# Patient Record
Sex: FEMALE | URBAN - METROPOLITAN AREA
[De-identification: names, ages, dates, MRNs, and addresses within clinical notes are randomized per-mention and may not be internally consistent; named-entity substitution may affect disease eponyms.]

---

## 2023-05-19 ENCOUNTER — APPOINTMENT (RX ONLY)
Dept: URBAN - METROPOLITAN AREA CLINIC 164 | Facility: CLINIC | Age: 33
Setting detail: DERMATOLOGY
End: 2023-05-19

## 2023-05-19 DIAGNOSIS — Z41.9 ENCOUNTER FOR PROCEDURE FOR PURPOSES OTHER THAN REMEDYING HEALTH STATE, UNSPECIFIED: ICD-10-CM

## 2023-05-19 PROCEDURE — ? PRODUCT LINE (ZO SKIN HEALTH)

## 2023-05-19 PROCEDURE — ? DELUXE HYDRAFACIAL

## 2023-05-19 PROCEDURE — ? ADDITIONAL NOTES

## 2023-05-19 NOTE — PROCEDURE: DELUXE HYDRAFACIAL
Price (Use Numbers Only, No Special Characters Or $): 241 Price (Use Numbers Only, No Special Characters Or $): 389

## 2023-05-19 NOTE — PROCEDURE: ADDITIONAL NOTES
Detail Level: Zone
Additional Notes: Patient came in for a HydraFacial(Deluxe). Patients concerns are mainly blackheads/oil glands.\\nI preformed physical extractions along with HydraFacial. I recommended ZO Exfoliating Polish & Complexion Renewal Pads.\\n\\nPatient was pleased with treatment & did purchase products stated above. \\n\\nPatient left with products & literature. Patient will call to schedule any further treatments.
Render Risk Assessment In Note?: no

## 2023-05-19 NOTE — PROCEDURE: PRODUCT LINE (ZO SKIN HEALTH)
Product 4 Units: 0
Risk Of Complication Category: No MDM
Product 10 Price (In Dollars - Numeric Only, No Special Characters Or $): 47
Product 18 Application Directions: Apply 2x a week at night for week 0-2.\\nApply 3x a week at night for week 2-4.\\nApply 4x a week at night for week 4-6.
Product 33 Price (In Dollars - Numeric Only, No Special Characters Or $): 0.00
Product 7 Application Directions: Apply in the PM, include the neck. Can be applied in the AM if dry.
Name Of Product 13: Rozatrol
Product 4 Application Directions: Apply AM & PM 7x a week.
Product 21 Price (In Dollars - Numeric Only, No Special Characters Or $): 100
Product 25 Application Directions: Apply AM & PM on the lower lids only, after cleansing.
Allow Plan To Count Towards E/M Coding: Yes
Product 2 Price (In Dollars - Numeric Only, No Special Characters Or $): 68
Product 16 Price (In Dollars - Numeric Only, No Special Characters Or $): 193
Product 23 Application Directions: Apply to upper & lower lids after cleansing.
Name Of Product 8: Growth Factor Serum
Product 15 Application Directions: Apply 2x a week at night (0-2 weeks)\\nApply 3x a week at night (2-4 weeks)\\nApply 4x a week at night (4-6 weeks)
Name Of Product 19: Exfoliating Polish (travel)
Name Of Product 5: Complexion Renewal Pads
Product 13 Price (In Dollars - Numeric Only, No Special Characters Or $): 91
Product 10 Application Directions: Cleanse AM & PM 7x a week.
Name Of Product 16: Skin Brightening Program
Product 2 Units: 1
Product 8 Price (In Dollars - Numeric Only, No Special Characters Or $): 155
Product 19 Price (In Dollars - Numeric Only, No Special Characters Or $): 21
Product 2 Application Directions: Use AM or PM 2-3x a week.
Product 16 Application Directions: Written directions were handed to patient.
Product 5 Price (In Dollars - Numeric Only, No Special Characters Or $): 53
Name Of Product 11: Oil Control Pads
Name Of Product 22: Retinol Skin Brightener 0.1 Retinol
Product 13 Application Directions: Apply AM & PM before moisturizer.
Name Of Product 3: Calming Toner
Name Of Product 17: Sunscreen + Primer SPF 30
Product 11 Price (In Dollars - Numeric Only, No Special Characters Or $): 64
Name Of Product 24: ZO BrightAlive Skin Brightener
Product 19 Application Directions: Exfoliate 2-3x a week.
Product 5 Application Directions: Apply after cleansing in the AM & PM 7x a week.
Product 17 Price (In Dollars - Numeric Only, No Special Characters Or $): 67
Name Of Product 14: Dual Action Scrub
Product 24 Price (In Dollars - Numeric Only, No Special Characters Or $): 130
Product 3 Price (In Dollars - Numeric Only, No Special Characters Or $): 40
Name Of Product 20: Instant Pore Refiner
Name Of Product 9: Daily Power Defense
Product 14 Price (In Dollars - Numeric Only, No Special Characters Or $): 80
Name Of Product 6: Hydrating Cleanser
Name Of Product 1: Gentle Cleanser
Product 9 Price (In Dollars - Numeric Only, No Special Characters Or $): 160
Product 17 Application Directions: Apply AM & reapply if prolong in the sun, every 2 hours.
Product 22 Application Directions: Written instructions were handed to the patient.
Product 24 Application Directions: Apply AM & PM after cleansing & toner.
Product 20 Price (In Dollars - Numeric Only, No Special Characters Or $): 62
Product 3 Application Directions: Apply AM & PM 7x a week after cleansing or exfoliating (on days you exfoliate).
Name Of Product 12: Acne Control
Name Of Product 18: ZO Skin Brightener 0.5 Retinol
Product 14 Application Directions: Scrub in the PM 2-3 a week.
Name Of Product 7: Recovery Creme
Product 12 Price (In Dollars - Numeric Only, No Special Characters Or $): 36
Name Of Product 4: Renewal Creme
Detail Level: Zone
Name Of Product 23: Grown Factor Eye Serum
Name Of Product 25: Brightening Eye Creme
Name Of Product 15: Wrinkle + Texture Repair
Product 7 Price (In Dollars - Numeric Only, No Special Characters Or $): 120
Product 4 Price (In Dollars - Numeric Only, No Special Characters Or $): 115
Product 6 Application Directions: Use AM & PM 7x a week
Name Of Product 10: Exfoliating Cleanser
Assigning Risk Information: Per AMA, level of risk is based upon consequences of the problem(s) addressed at the encounter when appropriately treated. Risk also includes medical decision making related to the need to initiate or forego further testing, treatment and/or hospitalization. Over the counter medication are assigned a risk level of low. Prescription medication management is assigned a risk level of moderate.
Name Of Product 21: Retinol Skin Brightener 0.25
Product 15 Price (In Dollars - Numeric Only, No Special Characters Or $): 154
Name Of Product 2: Exfoliating Polish
Product 12 Application Directions: Cleanse & Apply to cystic flare up 1-3x a day.

## 2023-06-23 ENCOUNTER — APPOINTMENT (RX ONLY)
Dept: URBAN - METROPOLITAN AREA CLINIC 164 | Facility: CLINIC | Age: 33
Setting detail: DERMATOLOGY
End: 2023-06-23

## 2023-06-23 DIAGNOSIS — Z41.9 ENCOUNTER FOR PROCEDURE FOR PURPOSES OTHER THAN REMEDYING HEALTH STATE, UNSPECIFIED: ICD-10-CM

## 2023-06-23 PROCEDURE — ? ADDITIONAL NOTES

## 2023-06-23 PROCEDURE — ? PRODUCT LINE (OBAGI)

## 2023-06-23 PROCEDURE — ? COSMETIC CONSULTATION: CHEMICAL PEELS

## 2023-06-23 PROCEDURE — ? DELUXE HYDRAFACIAL

## 2023-06-23 PROCEDURE — ? COSMETIC QUOTE

## 2023-06-23 NOTE — PROCEDURE: COSMETIC CONSULTATION: CHEMICAL PEELS
Price (Use Numbers Only, No Special Characters Or $): 389 Consultation Charge $ (Use Numbers Only, No Special Characters Or $): 900

## 2023-06-23 NOTE — PROCEDURE: PRODUCT LINE (OBAGI)
Product 23 Units: 0
Product 46 Price (In Dollars - Numeric Only, No Special Characters Or $): 0.00
Product 18 Application Directions: Apply 2-3x a week only in the PM.
Product 6 Application Directions: On cleansed skin apply to upper lash line nightly.
Name Of Product 14: ELASTIderm Facial Serum
Product 4 Price (In Dollars - Numeric Only, No Special Characters Or $): 75
Product 21 Price (In Dollars - Numeric Only, No Special Characters Or $): 40.50
Product 16 Price (In Dollars - Numeric Only, No Special Characters Or $): 84
Send Charges To Patient Encounter: Yes
Product 23 Application Directions: Mix a pea size amount with Obagi Nu-Derm  every other night.
Product 11 Application Directions: Apply AM & PM after cleansing.
Name Of Product 2: ELASTIderm Eye Serum
Product 9 Price (In Dollars - Numeric Only, No Special Characters Or $): 42
Product 14 Price (In Dollars - Numeric Only, No Special Characters Or $): 198
Name Of Product 19: ELASTIderm Eye Cream
Name Of Product 7: Obagi Toner
Name Of Product 24: Nu-Cil Eyebrow Boosting Serum
Product 4 Application Directions: Apply AM & PM
Name Of Product 12: Obagi SunShield SPF 50 Matte
Product 2 Price (In Dollars - Numeric Only, No Special Characters Or $): 107
Product 16 Application Directions: Use 2-3 a week. After cleansing rub into damp skin & leave on for 10-15 minutes.
Product 7 Price (In Dollars - Numeric Only, No Special Characters Or $): 43
Product 9 Application Directions: Cleanse AM & PM 7x a week.
Product 19 Price (In Dollars - Numeric Only, No Special Characters Or $): 118
Product 24 Price (In Dollars - Numeric Only, No Special Characters Or $): 145
Name Of Product 5: Hydrate Moisturizer
Name Of Product 17: SunShield Broad Spectrum SPF 50 Tint (Cool)
Product 12 Price (In Dollars - Numeric Only, No Special Characters Or $): 54
Name Of Product 10: Obagi Rebalance
Name Of Product 22: Nu-Derm Gentle Cleanser
Product 2 Application Directions: Apply eye serum AM and eye cream in the PM.
Product 5 Price (In Dollars - Numeric Only, No Special Characters Or $): 55
Product 19 Application Directions: Apply to cleansed skin AM & PM.
Name Of Product 15: Tretinoin 0.1%
Product 17 Price (In Dollars - Numeric Only, No Special Characters Or $): 54
Product 24 Application Directions: Apply to cleansed skin every night.
Product 12 Application Directions: Apply AM 7x a week.
Product 10 Price (In Dollars - Numeric Only, No Special Characters Or $): 110
Product 22 Price (In Dollars - Numeric Only, No Special Characters Or $): 44
Name Of Product 3: Daily Hydro-Drops
Name Of Product 20: CLENZIderm Daily Care Foaming Cleanser
Name Of Product 8: Obagi Sun Shield Tint SPF (Warm)
Name Of Product 13: Obagi Nu-Derm Normal to Dry kit
Product 5 Application Directions: Apply AM & PM 7x a week.
Product 3 Price (In Dollars - Numeric Only, No Special Characters Or $): 98
Product 17 Application Directions: Apply in the AM & reapply if prolong in the sun.
Name Of Product 1: 15% Vitamin C Serum
Product 15 Price (In Dollars - Numeric Only, No Special Characters Or $): 79
Product 22 Application Directions: Cleanse AM & PM, 7x a week.
Product 10 Application Directions: Apply AM & PM as a moisturizer.
Name Of Product 18: Tretinoin 0.5
Product 13 Price (In Dollars - Numeric Only, No Special Characters Or $): 500
Name Of Product 6: Nu-Cil Eyelash Serum
Product 20 Units: 1
Name Of Product 23: Tretinoin 0.25
Detail Level: Zone
Product 1 Price (In Dollars - Numeric Only, No Special Characters Or $): 115.50
Risk Of Complication Category: No MDM
Name Of Product 11: ELASTIderm Neck & Decollete Concentrate
Product 15 Application Directions: Apply PM after cleansing & serums before moisturizer.
Product 8 Application Directions: Apply after cleansing in the AM. Reapply every two hours if prolong in the sun.
Product 6 Price (In Dollars - Numeric Only, No Special Characters Or $): 120
Product 18 Price (In Dollars - Numeric Only, No Special Characters Or $): 92
Product 23 Price (In Dollars - Numeric Only, No Special Characters Or $): 85
Product 13 Application Directions: Written instructions were handed to patient.
Name Of Product 4: Hydrate Luxe
Product 11 Price (In Dollars - Numeric Only, No Special Characters Or $): 250
Name Of Product 21: CLENZIderm Pore Therapy
Name Of Product 16: Professional-C Microdermabrasion Polish & Mask
Assigning Risk Information: Per AMA, level of risk is based upon consequences of the problem(s) addressed at the encounter when appropriately treated. Risk also includes medical decision making related to the need to initiate or forego further testing, treatment and/or hospitalization. Over the counter medication are assigned a risk level of low. Prescription medication management is assigned a risk level of moderate.
Name Of Product 9: Obagi 360 Exfoliating Cleanser

## 2023-06-23 NOTE — PROCEDURE: ADDITIONAL NOTES
Render Risk Assessment In Note?: no
Detail Level: Zone
Additional Notes: Patient came in for a HydraFacial add Dermaplane. Patient is breaking out on the forehead & I advised patient to start double cleansing at night. If doing so does not improve acne, I recommended to change her facial cleanser to Obagi Daily Care Foaming Cleanser. \\n\\nPatient was pleased with treatment & is scheduled for another HydraFacial add Dermaplane on July 21st.
Additional Notes: Consulted with patient regarding chemical peels for hyperpigmentation towards the fall time. Patient does stay home most days so this treatment could be started when patient is ready. I recommended VI Peel Purify w/ Precision Plus if acne is stagnant or Precision Plus if acne is cleared up.

## 2023-06-23 NOTE — PROCEDURE: COSMETIC QUOTE
Misc Procedure 1 Price/Unit (In Dollars- Use Only Numbers And Decimals): 499
Anesthesia 3 Price/Unit (In Dollars- Use Only Numbers And Decimals): 0.00
Laser 6 Percentage Discount: 0
Face Procedure 10 Figueroa/Unit (In Dollars- Use Only Numbers And Decimals): 369
Face Procedure 7 Price/Unit (In Dollars- Use Only Numbers And Decimals): 199
Show Monthly Cost Breakdown: No
Face Procedure 5 Price/Unit (In Dollars- Use Only Numbers And Decimals): 363.07
Face Procedure 2 Price/Unit (In Dollars- Use Only Numbers And Decimals): 307.90
Face Procedure 8: ZO 3 Step Peel
Body Procedure 1 Price/Unit (In Dollars- Use Only Numbers And Decimals): 299
Face Procedure 6: VI Peel Precision Plus
Face Procedure 6 Price/Unit (In Dollars- Use Only Numbers And Decimals): 389
Face Procedure 3 Price/Unit (In Dollars- Use Only Numbers And Decimals): 265
Number Of Months: 1
Face Procedure 9: VI Peel (Original)
Body Procedure 2 Price/Unit (In Dollars- Use Only Numbers And Decimals): 500
Detail Level: Zone
Face Procedure 4: Vi Peel (Advanced)
Face Procedure 6 Units: 3
Face Procedure 1: Milia extraction
Body Procedure 3: HydraFacial (Back)
Face Procedure 7: HydraFacial (Signature)
Include Sales Tax On Surgeon's Fees: Yes
Face Procedure 5: Vi Peel Purify w/ Precision Plus
Face Procedure 2: SkinPen
Body Procedure 1: VI Peel Body (small area)
Face Procedure 8 Price/Unit (In Dollars- Use Only Numbers And Decimals): 319
Face Procedure 3: HydraFacial (Deluxe)
Body Procedure 2: VI Peel Body (large area)
Misc Procedure 1: HydraFacial Keravive
Face Procedure 10: VI Peel (Purify)
Face Procedure 1 Price/Unit (In Dollars- Use Only Numbers And Decimals): 150

## 2023-06-23 NOTE — PROCEDURE: DELUXE HYDRAFACIAL
Price (Use Numbers Only, No Special Characters Or $): 151 Price (Use Numbers Only, No Special Characters Or $): 282

## 2023-06-23 NOTE — PROCEDURE: DELUXE HYDRAFACIAL
Biopsy Method: Personna blade Bill For Surgical Tray: no Electrodesiccation Text: The wound bed was treated with electrodesiccation after the biopsy was performed. Cryotherapy Text: The wound bed was treated with cryotherapy after the biopsy was performed. Wound Care: No ointment Tip: Hydropeel Tip, Clear X Size Of Lesion In Cm: 0 Curettage Text: The wound bed was treated with curettage after the biopsy was performed. Post-Care Instructions: I reviewed with the patient in detail post-care instructions. Patient is to wear sun protection, and avoid picking at any of the treated lesions. Pt was advised to wash daily with gentle soap and water and may apply Vaseline to crusted or scabbing areas. Biopsy Type: H and E Type Of Destruction Used: Curettage Anesthesia Type: 1% lidocaine without epinephrine Lab Facility: [id number] Anesthesia Volume In Cc: 0.5 Body Location Override (Optional - Billing Will Still Be Based On Selected Body Map Location If Applicable): right rib cage superior Detail Level: Detailed Render Post-Care Instructions In Note?: yes Billing Type: Third-Party Bill Notification Instructions: Results will be faxed to PCP once they are available. Silver Nitrate Text: The wound bed was treated with silver nitrate after the biopsy was performed. Electrodesiccation And Curettage Text: The wound bed was treated with electrodesiccation and curettage after the biopsy was performed. Hemostasis: Aluminum Chloride Dressing: bandage Lab: [id number] Body Location Override (Optional - Billing Will Still Be Based On Selected Body Map Location If Applicable): right rib cage inferior Body Location Override (Optional - Billing Will Still Be Based On Selected Body Map Location If Applicable): left buttock Body Location Override (Optional - Billing Will Still Be Based On Selected Body Map Location If Applicable): left rib cage